# Patient Record
Sex: FEMALE | Race: WHITE | NOT HISPANIC OR LATINO | URBAN - METROPOLITAN AREA
[De-identification: names, ages, dates, MRNs, and addresses within clinical notes are randomized per-mention and may not be internally consistent; named-entity substitution may affect disease eponyms.]

---

## 2024-10-29 ENCOUNTER — OFFICE (OUTPATIENT)
Dept: URBAN - METROPOLITAN AREA CLINIC 11 | Facility: CLINIC | Age: 69
End: 2024-10-29
Payer: COMMERCIAL

## 2024-10-29 VITALS
WEIGHT: 139 LBS | DIASTOLIC BLOOD PRESSURE: 87 MMHG | SYSTOLIC BLOOD PRESSURE: 143 MMHG | HEIGHT: 61 IN | HEART RATE: 113 BPM | OXYGEN SATURATION: 94 %

## 2024-10-29 DIAGNOSIS — R79.89 OTHER SPECIFIED ABNORMAL FINDINGS OF BLOOD CHEMISTRY: ICD-10-CM

## 2024-10-29 DIAGNOSIS — R74.01 ELEVATION OF LEVELS OF LIVER TRANSAMINASE LEVELS: ICD-10-CM

## 2024-10-29 DIAGNOSIS — R13.19 OTHER DYSPHAGIA: ICD-10-CM

## 2024-10-29 DIAGNOSIS — K76.0 FATTY (CHANGE OF) LIVER, NOT ELSEWHERE CLASSIFIED: ICD-10-CM

## 2024-10-29 DIAGNOSIS — R19.7 DIARRHEA, UNSPECIFIED: ICD-10-CM

## 2024-10-29 PROCEDURE — 99214 OFFICE O/P EST MOD 30 MIN: CPT | Performed by: NURSE PRACTITIONER

## 2024-11-06 LAB
HEPATIC FUNCTION PANEL (7): ALBUMIN: 4.1 G/DL (ref 3.9–4.9)
HEPATIC FUNCTION PANEL (7): ALKALINE PHOSPHATASE: 136 IU/L — HIGH (ref 44–121)
HEPATIC FUNCTION PANEL (7): ALT (SGPT): 15 IU/L (ref 0–32)
HEPATIC FUNCTION PANEL (7): AST (SGOT): 38 IU/L (ref 0–40)
HEPATIC FUNCTION PANEL (7): BILIRUBIN, DIRECT: 0.2 MG/DL (ref 0–0.4)
HEPATIC FUNCTION PANEL (7): BILIRUBIN, TOTAL: 0.5 MG/DL (ref 0–1.2)
HEPATIC FUNCTION PANEL (7): PROTEIN, TOTAL: 6.9 G/DL (ref 6–8.5)
HERED.HEMOCHROMATOSIS, DNA: HEREDITARY  HEMOCHROMATOSIS: (no result)
HERED.HEMOCHROMATOSIS, DNA: REVIEWED BY: (no result)

## 2024-12-05 ENCOUNTER — OFFICE (OUTPATIENT)
Dept: URBAN - METROPOLITAN AREA CLINIC 11 | Facility: CLINIC | Age: 69
End: 2024-12-05
Payer: COMMERCIAL

## 2024-12-05 VITALS — HEIGHT: 61 IN

## 2024-12-05 DIAGNOSIS — K76.0 FATTY (CHANGE OF) LIVER, NOT ELSEWHERE CLASSIFIED: ICD-10-CM

## 2024-12-05 PROCEDURE — 76981 USE PARENCHYMA: CPT | Performed by: NURSE PRACTITIONER

## 2024-12-16 ENCOUNTER — OFFICE (OUTPATIENT)
Dept: URBAN - METROPOLITAN AREA PATHOLOGY 12 | Facility: PATHOLOGY | Age: 69
End: 2024-12-16
Payer: COMMERCIAL

## 2024-12-16 ENCOUNTER — AMBULATORY SURGICAL CENTER (OUTPATIENT)
Dept: URBAN - METROPOLITAN AREA SURGERY 3 | Facility: SURGERY | Age: 69
End: 2024-12-16
Payer: COMMERCIAL

## 2024-12-16 VITALS
DIASTOLIC BLOOD PRESSURE: 70 MMHG | DIASTOLIC BLOOD PRESSURE: 78 MMHG | RESPIRATION RATE: 20 BRPM | RESPIRATION RATE: 16 BRPM | HEART RATE: 83 BPM | TEMPERATURE: 97.6 F | DIASTOLIC BLOOD PRESSURE: 69 MMHG | TEMPERATURE: 98.3 F | OXYGEN SATURATION: 94 % | HEIGHT: 61 IN | SYSTOLIC BLOOD PRESSURE: 109 MMHG | SYSTOLIC BLOOD PRESSURE: 120 MMHG | OXYGEN SATURATION: 100 % | SYSTOLIC BLOOD PRESSURE: 131 MMHG | HEART RATE: 95 BPM | SYSTOLIC BLOOD PRESSURE: 137 MMHG | HEART RATE: 82 BPM | DIASTOLIC BLOOD PRESSURE: 75 MMHG | WEIGHT: 131.6 LBS | OXYGEN SATURATION: 97 % | HEART RATE: 87 BPM | OXYGEN SATURATION: 98 % | HEART RATE: 88 BPM

## 2024-12-16 DIAGNOSIS — K57.30 DIVERTICULOSIS OF LARGE INTESTINE WITHOUT PERFORATION OR ABS: ICD-10-CM

## 2024-12-16 DIAGNOSIS — K22.2 ESOPHAGEAL OBSTRUCTION: ICD-10-CM

## 2024-12-16 DIAGNOSIS — K63.89 OTHER SPECIFIED DISEASES OF INTESTINE: ICD-10-CM

## 2024-12-16 DIAGNOSIS — K29.50 UNSPECIFIED CHRONIC GASTRITIS WITHOUT BLEEDING: ICD-10-CM

## 2024-12-16 DIAGNOSIS — R19.7 DIARRHEA, UNSPECIFIED: ICD-10-CM

## 2024-12-16 DIAGNOSIS — K31.89 OTHER DISEASES OF STOMACH AND DUODENUM: ICD-10-CM

## 2024-12-16 PROBLEM — K29.70 GASTRITIS, UNSPECIFIED, WITHOUT BLEEDING: Status: ACTIVE | Noted: 2024-12-16

## 2024-12-16 PROCEDURE — 88305 TISSUE EXAM BY PATHOLOGIST: CPT | Performed by: STUDENT IN AN ORGANIZED HEALTH CARE EDUCATION/TRAINING PROGRAM

## 2024-12-16 PROCEDURE — 43239 EGD BIOPSY SINGLE/MULTIPLE: CPT | Mod: 51 | Performed by: INTERNAL MEDICINE

## 2024-12-16 PROCEDURE — 88342 IMHCHEM/IMCYTCHM 1ST ANTB: CPT | Performed by: STUDENT IN AN ORGANIZED HEALTH CARE EDUCATION/TRAINING PROGRAM

## 2024-12-16 PROCEDURE — 88313 SPECIAL STAINS GROUP 2: CPT | Performed by: STUDENT IN AN ORGANIZED HEALTH CARE EDUCATION/TRAINING PROGRAM

## 2024-12-16 PROCEDURE — 45380 COLONOSCOPY AND BIOPSY: CPT | Performed by: INTERNAL MEDICINE

## 2024-12-16 PROCEDURE — 43450 DILATE ESOPHAGUS 1/MULT PASS: CPT | Mod: 51 | Performed by: INTERNAL MEDICINE

## 2024-12-20 LAB — GASTRO ONE PATHOLOGY: PDF REPORT: (no result)

## 2024-12-31 ENCOUNTER — OFFICE (OUTPATIENT)
Dept: URBAN - METROPOLITAN AREA CLINIC 11 | Facility: CLINIC | Age: 69
End: 2024-12-31
Payer: COMMERCIAL

## 2024-12-31 VITALS
HEART RATE: 127 BPM | DIASTOLIC BLOOD PRESSURE: 93 MMHG | WEIGHT: 129 LBS | OXYGEN SATURATION: 94 % | HEIGHT: 61 IN | DIASTOLIC BLOOD PRESSURE: 97 MMHG | SYSTOLIC BLOOD PRESSURE: 159 MMHG

## 2024-12-31 DIAGNOSIS — R74.01 ELEVATION OF LEVELS OF LIVER TRANSAMINASE LEVELS: ICD-10-CM

## 2024-12-31 DIAGNOSIS — R63.4 ABNORMAL WEIGHT LOSS: ICD-10-CM

## 2024-12-31 DIAGNOSIS — R13.19 OTHER DYSPHAGIA: ICD-10-CM

## 2024-12-31 DIAGNOSIS — K76.0 FATTY (CHANGE OF) LIVER, NOT ELSEWHERE CLASSIFIED: ICD-10-CM

## 2024-12-31 PROCEDURE — 99214 OFFICE O/P EST MOD 30 MIN: CPT | Performed by: NURSE PRACTITIONER

## 2024-12-31 NOTE — SERVICEHPINOTES
Ms. Aguirre presents today for follow up of dysphagia. She notes that she continues to feel as if solid food gets stuck in the anterior cervical area that has been present for a few years. She notes that intake of liquids does not cause issues. She continues to take Pantoprazole BID. She denies presence of retrosternal burning or regurgitation. She denies recent issues with nausea or vomiting. EGD on 12/16/24 with evidence of ring at GE junction s/p dilation without improvement of her dysphagia. br
br9/24/24 Esophagram small to mod HH with spontaneous reflux a few times at the lower oioewusrswv04/4/24 MBS- intermittent flash laryngeal penetration of thin liquids without aspiration .
br
br 
She currently has a bowel movement 2-3 times per day. Stools are soft and brown. She denies presence of hematochezia or melena. She denies recent notation of passage of greasy or oily stools. She notes that she continues to lose weight despite not trying. She has lost 10 pounds since her last office visit. She does note that her intake has reduced due to the issues with swallowing. br
br Her fibroscan revealed F4 but was not consistent with steatosis. The fiborsure was unable to be obtained. She notes that she drinks roughly 2 glasses of wine every night. She notes that she has had severe diffuse itching that started three days ago. She notes that it is constant and unrelenting. She denies scleral icterus, darkening of urine, increase in abdominal girth, or lower extremity swelling.

## 2024-12-31 NOTE — SERVICENOTES
She continues to have issues despite dilation at time of EGD. Discussed possibility of manometry due to ongoing dysphagia. Will discuss with Dr. Dowell. GERD symptoms are well controlled on PPI therapy which we will continue. We discussed her previous fibroscan which revealed F4 fibrosis, but the fibrosure was unable to be performed. Will try to reassess fibrosure and if again is unable to be done will plan for MRI electrography. We discussed her weight loss and will need to plan for CT A/P at this time.

## 2025-01-02 LAB
ASH FIBROSURE: ALPHA 2-MACROGLOBULINS, QN: 173 MG/DL (ref 110–276)
ASH FIBROSURE: ALT (SGPT) P5P: 22 IU/L (ref 0–40)
ASH FIBROSURE: APOLIPOPROTEIN A-1: 314 MG/DL — HIGH (ref 116–209)
ASH FIBROSURE: AST (SGOT) P5P: 53 IU/L — HIGH (ref 0–40)
ASH FIBROSURE: BILIRUBIN, TOTAL: 0.2 MG/DL (ref 0–1.2)
ASH FIBROSURE: CHOLESTEROL, TOTAL: 273 MG/DL — HIGH (ref 100–199)
ASH FIBROSURE: COMMENT: (no result)
ASH FIBROSURE: FIBROSIS SCORING: (no result)
ASH FIBROSURE: FIBROSIS STAGE: (no result)
ASH FIBROSURE: GGT: 163 IU/L — HIGH (ref 0–60)
ASH FIBROSURE: GLUCOSE, SERUM: 102 MG/DL — HIGH (ref 70–99)
ASH FIBROSURE: HAPTOGLOBIN: <10 MG/DL — LOW
ASH FIBROSURE: HEIGHT: 61 IN
ASH FIBROSURE: LIMITATIONS: (no result)
ASH FIBROSURE: METHODOLOGY: (no result)
ASH FIBROSURE: TRIGLYCERIDES: 105 MG/DL (ref 0–149)
ASH FIBROSURE: WEIGHT: 129 LBS
CBC, PLATELET, NO DIFFERENTIAL: HEMATOCRIT: 47.8 % — HIGH (ref 34–46.6)
CBC, PLATELET, NO DIFFERENTIAL: HEMOGLOBIN: 16 G/DL — HIGH (ref 11.1–15.9)
CBC, PLATELET, NO DIFFERENTIAL: MCH: 32.9 PG (ref 26.6–33)
CBC, PLATELET, NO DIFFERENTIAL: MCHC: 33.5 G/DL (ref 31.5–35.7)
CBC, PLATELET, NO DIFFERENTIAL: MCV: 98 FL — HIGH (ref 79–97)
CBC, PLATELET, NO DIFFERENTIAL: PLATELETS: 157 X10E3/UL (ref 150–450)
CBC, PLATELET, NO DIFFERENTIAL: RBC: 4.86 X10E6/UL (ref 3.77–5.28)
CBC, PLATELET, NO DIFFERENTIAL: RDW: 11.8 % (ref 11.7–15.4)
CBC, PLATELET, NO DIFFERENTIAL: WBC: 6.3 X10E3/UL (ref 3.4–10.8)
COMP. METABOLIC PANEL (14): ALBUMIN: 4 G/DL (ref 3.9–4.9)
COMP. METABOLIC PANEL (14): ALKALINE PHOSPHATASE: 124 IU/L — HIGH (ref 44–121)
COMP. METABOLIC PANEL (14): ALT (SGPT): 20 IU/L (ref 0–32)
COMP. METABOLIC PANEL (14): AST (SGOT): 43 IU/L — HIGH (ref 0–40)
COMP. METABOLIC PANEL (14): BILIRUBIN, TOTAL: 0.5 MG/DL (ref 0–1.2)
COMP. METABOLIC PANEL (14): BUN/CREATININE RATIO: 12 (ref 12–28)
COMP. METABOLIC PANEL (14): BUN: 10 MG/DL (ref 8–27)
COMP. METABOLIC PANEL (14): CALCIUM: 9.5 MG/DL (ref 8.7–10.3)
COMP. METABOLIC PANEL (14): CARBON DIOXIDE, TOTAL: 25 MMOL/L (ref 20–29)
COMP. METABOLIC PANEL (14): CHLORIDE: 99 MMOL/L (ref 96–106)
COMP. METABOLIC PANEL (14): CREATININE: 0.84 MG/DL (ref 0.57–1)
COMP. METABOLIC PANEL (14): EGFR: 76 ML/MIN/1.73 (ref 59–?)
COMP. METABOLIC PANEL (14): GLOBULIN, TOTAL: 2.9 G/DL (ref 1.5–4.5)
COMP. METABOLIC PANEL (14): GLUCOSE: 103 MG/DL — HIGH (ref 70–99)
COMP. METABOLIC PANEL (14): POTASSIUM: 4.1 MMOL/L (ref 3.5–5.2)
COMP. METABOLIC PANEL (14): PROTEIN, TOTAL: 6.9 G/DL (ref 6–8.5)
COMP. METABOLIC PANEL (14): SODIUM: 142 MMOL/L (ref 134–144)
CORRECTED REPORT COMMENT: (no result)
NASH FIBROSURE(R) PLUS: COMMENT: (no result)
NASH FIBROSURE(R) PLUS: FIBROSIS SCORE: (no result)
NASH FIBROSURE(R) PLUS: FIBROSIS STAGE: (no result)
NASH FIBROSURE(R) PLUS: INTERPRETATIONS: (no result)
NASH FIBROSURE(R) PLUS: LIMITATIONS: (no result)
NASH FIBROSURE(R) PLUS: NASH GRADE: (no result)
NASH FIBROSURE(R) PLUS: NASH SCORE: (no result)
NASH FIBROSURE(R) PLUS: NASH SCORING: (no result)
NASH FIBROSURE(R) PLUS: STEATOSIS GRADE: (no result)
NASH FIBROSURE(R) PLUS: STEATOSIS SCORE: (no result)
NASH FIBROSURE(R) PLUS: STEATOSIS SCORING: (no result)

## 2025-02-04 ENCOUNTER — OFFICE (OUTPATIENT)
Dept: URBAN - METROPOLITAN AREA CLINIC 11 | Facility: CLINIC | Age: 70
End: 2025-02-04
Payer: COMMERCIAL

## 2025-02-04 VITALS
SYSTOLIC BLOOD PRESSURE: 134 MMHG | OXYGEN SATURATION: 96 % | HEIGHT: 61 IN | DIASTOLIC BLOOD PRESSURE: 86 MMHG | HEART RATE: 129 BPM | WEIGHT: 124 LBS

## 2025-02-04 DIAGNOSIS — R19.7 DIARRHEA, UNSPECIFIED: ICD-10-CM

## 2025-02-04 DIAGNOSIS — R13.10 DYSPHAGIA, UNSPECIFIED: ICD-10-CM

## 2025-02-04 DIAGNOSIS — K76.0 FATTY (CHANGE OF) LIVER, NOT ELSEWHERE CLASSIFIED: ICD-10-CM

## 2025-02-04 PROCEDURE — 99214 OFFICE O/P EST MOD 30 MIN: CPT | Performed by: NURSE PRACTITIONER

## 2025-02-04 NOTE — SERVICENOTES
We reviewed her globus sensation and potential for sinus issues as well with her drainage We discussed trial of Allegra until her next visit. We also discussed her diarrhea and potential differentials including IBS, SIBO, food intolerances etc. Will plan for LBT. We discussed obtaining ELF scoring to assess fibrosis to compare with MRI elastography.

## 2025-02-04 NOTE — SERVICEHPINOTES
Ms. Aguirre presents today for follow up of dysphagia. She notes that she continues She notes that she continues to feel as if she can't make herself swallow meats. She denies feeling of food getting stuck. She notes that intake of liquids does not cause issues. She continues to take Pantoprazole BID. She denies presence of retrosternal burning or regurgitation. She denies recent issues with nausea or vomiting. EGD on 12/16/24 with evidence of ring at GE junction s/p dilation without improvement of her dysphagia. 9/24/24 Esophagram small to mod HH with spontaneous reflux a few times at the lower qfegafhcjgc01/4/24 MBS- intermittent flash laryngeal penetration of thin liquids without aspiration.She continues to have loose bowel movements. She has a solid bowel movement roughly once per week. She currently has a bowel movement 2-3 times per day. She denies presence of hematochezia or melena. She denies recent notation of passage of greasy or oily stools. She notes that she continues to lose weight despite not trying. She has lost 10 pounds since her last office visit. She does note that her intake has reduced due to the issues with swallowing. Her EGD/LO biopsy results were unremarkable. Stools were negative for infection. Fecal fat testing was normal. Pancreatic elastase testing was normal. Her fibroscan revealed F4 but was not consistent with steatosis. The fiborsure was unable to be obtained. She had a recent MRI on 1/31/25 which revealed F1-2 fibrosis and minimal steatosis. She is drinking roughly 1 glass of wine per day. She is continuing to decrease her intake. She continues to have intermittent diffuse itching. She denies scleral icterus, darkening of urine, increase in abdominal girth, or lower extremity swelling.

## 2025-04-08 ENCOUNTER — OFFICE (OUTPATIENT)
Dept: URBAN - METROPOLITAN AREA CLINIC 11 | Facility: CLINIC | Age: 70
End: 2025-04-08
Payer: COMMERCIAL

## 2025-04-08 VITALS
DIASTOLIC BLOOD PRESSURE: 79 MMHG | OXYGEN SATURATION: 99 % | SYSTOLIC BLOOD PRESSURE: 129 MMHG | HEIGHT: 61 IN | HEART RATE: 121 BPM | WEIGHT: 116 LBS

## 2025-04-08 DIAGNOSIS — R63.4 ABNORMAL WEIGHT LOSS: ICD-10-CM

## 2025-04-08 DIAGNOSIS — K76.0 FATTY (CHANGE OF) LIVER, NOT ELSEWHERE CLASSIFIED: ICD-10-CM

## 2025-04-08 DIAGNOSIS — R19.7 DIARRHEA, UNSPECIFIED: ICD-10-CM

## 2025-04-08 DIAGNOSIS — R11.2 NAUSEA WITH VOMITING, UNSPECIFIED: ICD-10-CM

## 2025-04-08 DIAGNOSIS — R13.12 DYSPHAGIA, OROPHARYNGEAL PHASE: ICD-10-CM

## 2025-04-08 DIAGNOSIS — K43.2 INCISIONAL HERNIA WITHOUT OBSTRUCTION OR GANGRENE: ICD-10-CM

## 2025-04-08 PROCEDURE — 99214 OFFICE O/P EST MOD 30 MIN: CPT | Performed by: NURSE PRACTITIONER

## 2025-04-08 RX ORDER — AMITRIPTYLINE HYDROCHLORIDE 10 MG/1
10 TABLET, FILM COATED ORAL
Qty: 90 | Refills: 3 | Status: ACTIVE
Start: 2025-04-08

## 2025-04-08 NOTE — SERVICENOTES
With her ongoing oropharyngeal dysphagia, will discuss with Dr. Dowell. Would you qualify for ST or referral to ENT? With her continued low calorie intake, early satiety, and nausea, discussed obtaining gastric emptying study. She was notable for a midline incisional abdominal wall hernia which will need to be evaluated by general surgery.

## 2025-04-08 NOTE — SERVICEHPINOTES
Ms. Aguirre presents today for follow up of dysphagia. She notes that she continues to have some difficulty swallowing. EGD on 12/16/24 with evidence of ring at GE junction s/p dilation without improvement of her dysphagia. 9/24/24 Esophagram small to mod HH with spontaneous reflux a few times at the lower [id number]/4/24 MBS- intermittent flash laryngeal penetration of thin liquids without aspiration. She notes that she will get to the point where she tries to swallow meat and is unable to do the action of swallowing. She continues to take Pantoprazole BID. She denies presence of retrosternal burning or regurgitation.br
br She notes that she had three days of nausea last week with diarrhea. It occurs roughly once per month. This week, she has had no nausea and regular bowel movements. She notes that she does have early satiety, but when she is nauseated she will have no appetite. She notes that she eats roughly two small meals per day. br
br She continues to have weight loss. She has lost 8 pounds since her last visit. Evaluation has been unremarkable thus far with negative EGD/LO and CT A/P. Her TSH was normal. She is going to keep a log of her calorie intake. She currently has a bowel movement once per day most often. She can have a bowel movement 2-4 times per day. Stools are typically soft and brown. She has a solid bowel movement a couple of times per week. She denies presence of hematochezia or melena. She denies recent notation of passage of greasy or oily stools. She has lower abdominal cramping that is associated with the bowel movements that improves shortly after her bowel movements. Her EGD/LO biopsy results were unremarkable. Stools were negative for infection. Fecal fat testing was normal. Pancreatic elastase testing was normal. She had a recent LBT which was normal. Her fibroscan revealed F4 but was not consistent with steatosis. The fiborsure was unable to be obtained. She had a recent MRI on 1/31/25 which revealed F1-2 fibrosis and minimal steatosis. She is drinking roughly 1 glass of wine per day. She is continuing to decrease her intake. She continues to have intermittent diffuse itching. She denies scleral icterus, darkening of urine, increase in abdominal girth, or lower extremity swelling.

## 2025-07-15 ENCOUNTER — OFFICE (OUTPATIENT)
Dept: URBAN - METROPOLITAN AREA CLINIC 11 | Facility: CLINIC | Age: 70
End: 2025-07-15
Payer: COMMERCIAL

## 2025-07-15 VITALS
HEART RATE: 60 BPM | DIASTOLIC BLOOD PRESSURE: 81 MMHG | WEIGHT: 100 LBS | SYSTOLIC BLOOD PRESSURE: 133 MMHG | HEIGHT: 61 IN | OXYGEN SATURATION: 99 %

## 2025-07-15 DIAGNOSIS — R19.7 DIARRHEA, UNSPECIFIED: ICD-10-CM

## 2025-07-15 DIAGNOSIS — R11.2 NAUSEA WITH VOMITING, UNSPECIFIED: ICD-10-CM

## 2025-07-15 DIAGNOSIS — R63.4 ABNORMAL WEIGHT LOSS: ICD-10-CM

## 2025-07-15 DIAGNOSIS — K31.84 GASTROPARESIS: ICD-10-CM

## 2025-07-15 DIAGNOSIS — K76.0 FATTY (CHANGE OF) LIVER, NOT ELSEWHERE CLASSIFIED: ICD-10-CM

## 2025-07-15 PROCEDURE — 99214 OFFICE O/P EST MOD 30 MIN: CPT | Performed by: NURSE PRACTITIONER

## 2025-07-15 NOTE — SERVICEHPINOTES
Ms. Aguirre presents today for follow up of dysphagia. She notes that she continues to have mild difficulty swallowing generally with intake of meat and denser foods. EGD on 12/16/24 with evidence of ring at GE junction s/p dilation without improvement of her dysphagia. 9/24/24 Esophagram small to mod HH with spontaneous reflux a few times at the lower suowovpan98/4/24 MBS- intermittent flash laryngeal penetration of thin liquids without aspiration. She continues to take Pantoprazole BID. She denies presence of retrosternal burning or regurgitation. She was noted to have emptying of only 48% on GES. She has not been consistent with taking the Metoclopramide. She takes it every other day or so. She is still only eating two meals per day. She denies tremors when she takes the medication. sin vogt She saw ENT. She notes that she had an US with notation of a large nodule on the thyroid. She had her records sent to Dr. Montano. The nodule was benign. It is large, and she was told that this could be a factor in her dysphagia. She had an incisional hernial repair on May 20th.
br
sin She notes that 4 weeks after surgery she felt great, had good appetite, normal taste, and was eating very well without issues.
br She notes that she had a gout attack at the beginning of this month at which time she was placed on Colchicine. She then developed diarrhea and nausea. She notes that she is having 8-10 bowel movements per day. Stools are liquid and brown. She denies presence of hematochezia or melena. She denies recent notation of passage of greasy or oily stools. Her EGD/LO biopsy results were unremarkable. Stools were negative for infection. Fecal fat testing was normal. Pancreatic elastase testing was normal. LBT which was normal.
sin vogt She has recently noted a bulge in the right groin that is large while standing and disappears when laying down. She notes that it is tender but easily reducible.She continues to have weight loss. She has lost 16 pounds since her last visit. Evaluation has been unremarkable thus far with negative EGD/LO and CT A/P. Her TSH was normal. She is going to keep a log of her calorie intake. She thought that her weight had stabilized after surgery until she started having diarrhea with the gout flare. sin Harry fibroscan revealed F4 but was not consistent with steatosis. The fiborsure was unable to be obtained. She had a recent MRI on 1/31/25 which revealed F1-2 fibrosis and minimal steatosis. She is drinking roughly 1 glass of wine per day. She is continuing to decrease her intake. She denies scleral icterus, darkening of urine, increase in abdominal girth, or lower extremity swelling.
br

## 2025-07-15 NOTE — SERVICENOTES
Discussed being consistent with Metoclopramide for the gastroparesis. Discussed her diarrhea and stopping Colchicine since diarrhea started with this medication. Discussed complete cessation of alcohol and potential repeat fibroscan in a couple of months. Will recheck her labs today. We reviewed her weight loss and correlation with recurrent diarrhea. Discussed adding protein supplementation and following up in 4 weeks to recheck weight.

## 2025-07-16 LAB
CBC, PLATELET, NO DIFFERENTIAL: HEMATOCRIT: 39.7 % (ref 34–46.6)
CBC, PLATELET, NO DIFFERENTIAL: HEMOGLOBIN: 12.7 G/DL (ref 11.1–15.9)
CBC, PLATELET, NO DIFFERENTIAL: MCH: 30.5 PG (ref 26.6–33)
CBC, PLATELET, NO DIFFERENTIAL: MCHC: 32 G/DL (ref 31.5–35.7)
CBC, PLATELET, NO DIFFERENTIAL: MCV: 95 FL (ref 79–97)
CBC, PLATELET, NO DIFFERENTIAL: PLATELETS: 366 X10E3/UL (ref 150–450)
CBC, PLATELET, NO DIFFERENTIAL: RBC: 4.16 X10E6/UL (ref 3.77–5.28)
CBC, PLATELET, NO DIFFERENTIAL: RDW: 12.3 % (ref 11.7–15.4)
CBC, PLATELET, NO DIFFERENTIAL: WBC: 8.5 X10E3/UL (ref 3.4–10.8)
COMP. METABOLIC PANEL (14): ALBUMIN: 4 G/DL (ref 3.9–4.9)
COMP. METABOLIC PANEL (14): ALKALINE PHOSPHATASE: 126 IU/L — HIGH (ref 44–121)
COMP. METABOLIC PANEL (14): ALT (SGPT): 34 IU/L — HIGH (ref 0–32)
COMP. METABOLIC PANEL (14): AST (SGOT): 54 IU/L — HIGH (ref 0–40)
COMP. METABOLIC PANEL (14): BILIRUBIN, TOTAL: 0.3 MG/DL (ref 0–1.2)
COMP. METABOLIC PANEL (14): BUN/CREATININE RATIO: 12 (ref 12–28)
COMP. METABOLIC PANEL (14): BUN: 10 MG/DL (ref 8–27)
COMP. METABOLIC PANEL (14): CALCIUM: 9.7 MG/DL (ref 8.7–10.3)
COMP. METABOLIC PANEL (14): CARBON DIOXIDE, TOTAL: 23 MMOL/L (ref 20–29)
COMP. METABOLIC PANEL (14): CHLORIDE: 98 MMOL/L (ref 96–106)
COMP. METABOLIC PANEL (14): CREATININE: 0.83 MG/DL (ref 0.57–1)
COMP. METABOLIC PANEL (14): EGFR: 76 ML/MIN/1.73 (ref 59–?)
COMP. METABOLIC PANEL (14): GLOBULIN, TOTAL: 2.9 G/DL (ref 1.5–4.5)
COMP. METABOLIC PANEL (14): GLUCOSE: 91 MG/DL (ref 70–99)
COMP. METABOLIC PANEL (14): POTASSIUM: 4.6 MMOL/L (ref 3.5–5.2)
COMP. METABOLIC PANEL (14): PROTEIN, TOTAL: 6.9 G/DL (ref 6–8.5)
COMP. METABOLIC PANEL (14): SODIUM: 139 MMOL/L (ref 134–144)
PROTHROMBIN TIME (PT): INR: 1.1 (ref 0.9–1.2)
PROTHROMBIN TIME (PT): PROTHROMBIN TIME: 11.7 SEC (ref 9.1–12)